# Patient Record
Sex: FEMALE | Race: BLACK OR AFRICAN AMERICAN | NOT HISPANIC OR LATINO | URBAN - METROPOLITAN AREA
[De-identification: names, ages, dates, MRNs, and addresses within clinical notes are randomized per-mention and may not be internally consistent; named-entity substitution may affect disease eponyms.]

---

## 2023-11-25 ENCOUNTER — HOSPITAL ENCOUNTER (EMERGENCY)
Facility: HOSPITAL | Age: 53
Discharge: HOME | End: 2023-11-25
Attending: EMERGENCY MEDICINE
Payer: COMMERCIAL

## 2023-11-25 VITALS
TEMPERATURE: 99 F | HEIGHT: 66 IN | BODY MASS INDEX: 31.34 KG/M2 | SYSTOLIC BLOOD PRESSURE: 135 MMHG | RESPIRATION RATE: 18 BRPM | OXYGEN SATURATION: 99 % | WEIGHT: 195 LBS | DIASTOLIC BLOOD PRESSURE: 79 MMHG | HEART RATE: 82 BPM

## 2023-11-25 DIAGNOSIS — Z48.89 ENCOUNTER FOR POST SURGICAL WOUND CHECK: Primary | ICD-10-CM

## 2023-11-25 LAB
ALBUMIN SERPL BCP-MCNC: 3.5 G/DL (ref 3.5–5.2)
ALLENS TEST: ABNORMAL
ALP SERPL-CCNC: 75 U/L (ref 55–135)
ALT SERPL W/O P-5'-P-CCNC: 31 U/L (ref 10–44)
ANION GAP SERPL CALC-SCNC: 15 MMOL/L (ref 8–16)
ANION GAP SERPL CALC-SCNC: 8 MMOL/L (ref 8–16)
AST SERPL-CCNC: 20 U/L (ref 10–40)
BASOPHILS # BLD AUTO: 0.04 K/UL (ref 0–0.2)
BASOPHILS NFR BLD: 0.6 % (ref 0–1.9)
BILIRUB SERPL-MCNC: 0.3 MG/DL (ref 0.1–1)
BUN SERPL-MCNC: 11 MG/DL (ref 6–20)
BUN SERPL-MCNC: 11 MG/DL (ref 6–30)
CALCIUM SERPL-MCNC: 9.3 MG/DL (ref 8.7–10.5)
CHLORIDE SERPL-SCNC: 103 MMOL/L (ref 95–110)
CHLORIDE SERPL-SCNC: 105 MMOL/L (ref 95–110)
CO2 SERPL-SCNC: 28 MMOL/L (ref 23–29)
CREAT SERPL-MCNC: 0.7 MG/DL (ref 0.5–1.4)
CREAT SERPL-MCNC: 0.8 MG/DL (ref 0.5–1.4)
DIFFERENTIAL METHOD: ABNORMAL
EOSINOPHIL # BLD AUTO: 0.2 K/UL (ref 0–0.5)
EOSINOPHIL NFR BLD: 3.1 % (ref 0–8)
ERYTHROCYTE [DISTWIDTH] IN BLOOD BY AUTOMATED COUNT: 13.2 % (ref 11.5–14.5)
EST. GFR  (NO RACE VARIABLE): >60 ML/MIN/1.73 M^2
GLUCOSE SERPL-MCNC: 116 MG/DL (ref 70–110)
GLUCOSE SERPL-MCNC: 117 MG/DL (ref 70–110)
HCT VFR BLD AUTO: 34.4 % (ref 37–48.5)
HCT VFR BLD CALC: 34 %PCV (ref 36–54)
HGB BLD-MCNC: 11.1 G/DL (ref 12–16)
IMM GRANULOCYTES # BLD AUTO: 0.1 K/UL (ref 0–0.04)
IMM GRANULOCYTES NFR BLD AUTO: 1.5 % (ref 0–0.5)
LYMPHOCYTES # BLD AUTO: 1.7 K/UL (ref 1–4.8)
LYMPHOCYTES NFR BLD: 25 % (ref 18–48)
MCH RBC QN AUTO: 29.4 PG (ref 27–31)
MCHC RBC AUTO-ENTMCNC: 32.3 G/DL (ref 32–36)
MCV RBC AUTO: 91 FL (ref 82–98)
MONOCYTES # BLD AUTO: 0.7 K/UL (ref 0.3–1)
MONOCYTES NFR BLD: 9.7 % (ref 4–15)
NEUTROPHILS # BLD AUTO: 4.1 K/UL (ref 1.8–7.7)
NEUTROPHILS NFR BLD: 60.1 % (ref 38–73)
NRBC BLD-RTO: 0 /100 WBC
PLATELET # BLD AUTO: 389 K/UL (ref 150–450)
PMV BLD AUTO: 9.1 FL (ref 9.2–12.9)
POC IONIZED CALCIUM: 1.24 MMOL/L (ref 1.06–1.42)
POC TCO2 (MEASURED): 28 MMOL/L (ref 23–29)
POTASSIUM BLD-SCNC: 4.1 MMOL/L (ref 3.5–5.1)
POTASSIUM SERPL-SCNC: 3.8 MMOL/L (ref 3.5–5.1)
PROT SERPL-MCNC: 7.8 G/DL (ref 6–8.4)
RBC # BLD AUTO: 3.78 M/UL (ref 4–5.4)
SAMPLE: ABNORMAL
SITE: ABNORMAL
SODIUM BLD-SCNC: 141 MMOL/L (ref 136–145)
SODIUM SERPL-SCNC: 141 MMOL/L (ref 136–145)
WBC # BLD AUTO: 6.8 K/UL (ref 3.9–12.7)

## 2023-11-25 PROCEDURE — 84132 ASSAY OF SERUM POTASSIUM: CPT

## 2023-11-25 PROCEDURE — 25500020 PHARM REV CODE 255: Performed by: EMERGENCY MEDICINE

## 2023-11-25 PROCEDURE — 82565 ASSAY OF CREATININE: CPT

## 2023-11-25 PROCEDURE — 80053 COMPREHEN METABOLIC PANEL: CPT

## 2023-11-25 PROCEDURE — 85025 COMPLETE CBC W/AUTO DIFF WBC: CPT

## 2023-11-25 PROCEDURE — 84295 ASSAY OF SERUM SODIUM: CPT

## 2023-11-25 PROCEDURE — 99900035 HC TECH TIME PER 15 MIN (STAT)

## 2023-11-25 PROCEDURE — 82330 ASSAY OF CALCIUM: CPT

## 2023-11-25 PROCEDURE — 85014 HEMATOCRIT: CPT

## 2023-11-25 PROCEDURE — 99285 EMERGENCY DEPT VISIT HI MDM: CPT | Mod: 25

## 2023-11-25 RX ADMIN — IOHEXOL 100 ML: 350 INJECTION, SOLUTION INTRAVENOUS at 04:11

## 2023-11-25 NOTE — ED NOTES
Pt had surgery for endometriosis on 11/16 in [place].  Pt presents today for surgical wound evaluation.  Pt states had a large hematoma to top of wound yesterday that spontaneously drained then re-developed today.  Top of abd wound not well approximated w/ purple and  and yellow   ecchymosis surrounding wound.  Denies redness, heat, purulent drainage or fever.  Pt is AAOx3, resp even and unlabored, skin warm and dry. NAD noted.   Visitor at bedside.

## 2023-11-25 NOTE — PROVIDER PROGRESS NOTES - EMERGENCY DEPT.
Encounter Date: 11/25/2023    ED Physician Progress Notes        Physician Note:   Case discussed with Henry Santillan PA-C, pending imaging and final disposition.  CT revealed     Ill-defined area of hyperdensity in the subcutaneous fat overlying the midline incision, which measures blood in attenuation.  Differential considerations may include postop seroma and much less likely abscess.  Otherwise, postsurgical changes of pelvic surgery including mild pneumoperitoneum, stranding of the pelvic fat, and possible mild reactive wall thickening of a short segment sigmoid colon.  Associated    Left hepatic lesion.  Given female patient and enhancement pattern, hemangioma is a primary consideration.  Consider nonemergent follow-up MRI of the liver for further delineation when clinically appropriate.  Hepatic steatosis.  Hepatomegaly.    Patient states that she is flying back to [place] in 2 days and she will see her surgeon on December 1, 2023.  Urged prompt follow-up with her general surgeon and PCP for further evaluation.    Strict return precautions given. I discussed with the patient/family the diagnosis, treatment plan, indications for return to the emergency department, and for expected follow-up. The patient/family verbalized an understanding. The patient/family is asked if there are any questions or concerns. We discuss the case, until all issues are addressed to the patient/family's satisfaction. Patient/family understands and is agreeable to the plan. Patient is stable and ready for discharge.

## 2023-11-25 NOTE — ED PROVIDER NOTES
Encounter Date: 11/25/2023       History     Chief Complaint   Patient presents with    Wound Check     Pt had surgery on 11/16 to have an her endometriosis removed. Pt reports wound is draining and would like and evaluation of incision. Pt denies increased pain. Denies fever or foul smelling odor      Lien Ramirez is a 53-year-old female with past medical history of endometriosis status post exploratory laparotomy on November 16, 2023 who presents to the emergency department with a chief complaint of wound check.  States that she has been having significant drainage from her surgical incision site and would like it checked.  Had a large volume of dark red blood drain from the wound over the last few days.  She denies any drainage currently.  Also denies fever, erythema, purulent drainage, or increased pain.  Her surgery was done at [facility].  Patient lives in [place], is flying back home in 2 days.    The history is provided by the patient. No  was used.     Review of patient's allergies indicates:  No Known Allergies  No past medical history on file.  No past surgical history on file.  No family history on file.     Review of Systems   Constitutional:  Negative for fever.   HENT:  Negative for sore throat.    Respiratory:  Negative for shortness of breath.    Cardiovascular:  Negative for chest pain.   Gastrointestinal:  Negative for nausea.   Genitourinary:  Negative for dysuria.   Musculoskeletal:  Negative for back pain.   Skin:  Positive for wound (Surgical incision). Negative for rash.   Neurological:  Negative for weakness.   Hematological:  Does not bruise/bleed easily.       Physical Exam     Initial Vitals [11/25/23 1502]   BP Pulse Resp Temp SpO2   139/77 98 18 98.2 °F (36.8 °C) 98 %      MAP       --         Physical Exam    Nursing note and vitals reviewed.  Constitutional: Vital signs are normal. She appears well-developed and well-nourished. She is  cooperative. She does not appear ill. No distress.   HENT:   Head: Normocephalic and atraumatic.   Right Ear: Hearing and external ear normal.   Left Ear: Hearing and external ear normal.   Nose: Nose normal.   Eyes: Conjunctivae and EOM are normal.   Neck: Phonation normal.   Normal range of motion.  Cardiovascular:  Normal rate and regular rhythm.           No murmur heard.  Pulmonary/Chest: Effort normal. No respiratory distress.   Abdominal: Abdomen is soft. Bowel sounds are normal. She exhibits no distension. A surgical scar is present. There is no abdominal tenderness.   Abdomen is soft.  There is a midline incision as graphically depicted above.  It appears well healed.  There is no drainage.  No bleeding.  No dehiscence.  Good amount of ecchymosis the inferior anterior abdominal wall.  No significant tenderness to palpation.  No warmth, erythema, or purulent drainage.     There is no rebound and no guarding.   Musculoskeletal:      Cervical back: Normal range of motion.     Neurological: She is alert and oriented to person, place, and time. GCS eye subscore is 4. GCS verbal subscore is 5. GCS motor subscore is 6.   Skin: Skin is warm. Capillary refill takes less than 2 seconds.         ED Course   Procedures  Labs Reviewed   CBC W/ AUTO DIFFERENTIAL - Abnormal; Notable for the following components:       Result Value    RBC 3.78 (*)     Hemoglobin 11.1 (*)     Hematocrit 34.4 (*)     MPV 9.1 (*)     Immature Granulocytes 1.5 (*)     Immature Grans (Abs) 0.10 (*)     All other components within normal limits   COMPREHENSIVE METABOLIC PANEL - Abnormal; Notable for the following components:    Glucose 116 (*)     All other components within normal limits   ISTAT PROCEDURE - Abnormal; Notable for the following components:    POC Glucose 117 (*)     POC Hematocrit 34 (*)     All other components within normal limits   ISTAT CHEM8          Imaging Results              CT Abdomen Pelvis With IV Contrast NO Oral  Contrast (In process)                      Medications   iohexoL (OMNIPAQUE 350) injection 100 mL (100 mLs Intravenous Given 11/25/23 6644)     Medical Decision Making  53-year-old female status post exploratory laparotomy on November 16th in [place] presenting to the emergency department with for a wound check.  States that she had some dark red blood drain out of her incision site and is concerned that she may have an infection.  On physical exam, she was clinically well-appearing and in no acute distress.  Afebrile.  No tachycardia.  Abdomen is soft.  Incision site with no active drainage.  No surrounding signs of soft tissue infection such as erythema or warmth.    Differential diagnosis includes but is not limited to postoperative infection, wound dehiscence, postoperative hematoma.    Labs reassuring.  No leukocytosis.  Minimal anemia.  CMP and i-STAT essentially within normal limits noting mild hyperglycemia.  CT pending at this time.  Care of this patient discussed with and handed off to Yesenia Capone PA-C at the end of my shift pending CT.  She will determine final diagnoses and disposition.    Amount and/or Complexity of Data Reviewed  Labs: ordered. Decision-making details documented in ED Course.  Radiology: ordered.                                   Clinical Impression:  Final diagnoses:  [Z48.89] Encounter for post surgical wound check (Primary)                 Pedro Santillan PA-C  11/25/23 5969

## 2023-11-26 NOTE — DISCHARGE INSTRUCTIONS
Please return to the Emergency Department for any new or worsening symptoms including: fever, chest pain, shortness of breath, loss of consciousness, dizziness, weakness, or any other concerns.     Please follow up with your Primary Care Provider within in the week. If you do not have one, you may contact the one listed on your discharge paperwork or you may also call the [facility] Appointment Desk at [phone] to schedule an appointment with one.